# Patient Record
Sex: FEMALE | Race: BLACK OR AFRICAN AMERICAN | URBAN - METROPOLITAN AREA
[De-identification: names, ages, dates, MRNs, and addresses within clinical notes are randomized per-mention and may not be internally consistent; named-entity substitution may affect disease eponyms.]

---

## 2017-10-16 ENCOUNTER — HISTORICAL (OUTPATIENT)
Dept: ADMINISTRATIVE | Facility: HOSPITAL | Age: 57
End: 2017-10-16

## 2022-04-30 NOTE — ED PROVIDER NOTES
Patient:   Mikayla Pearson            MRN: [id number]            FIN: [id number]               Age:   57 years     Sex:  Female     :  1960   Associated Diagnoses:   Left knee pain; Left shoulder pain; Degenerative joint disease of left shoulder; Status post total left knee replacement   Author:   Paige Miles      Basic Information   Time seen: Date & time 10/16/2017 14:00:00.   History source: Patient.   Arrival mode: Private vehicle, wheelchair.   Additional information: Chief Complaint from Nursing Triage Note : Chief Complaint   10/16/2017 13:18 CDT     Chief Complaint           C/O LEFT SHOULDER AND LEFT KNEE PAIN. FELL X 2 WEEKS AGO. SENT HERE FOR ORTHO REFERRAL  .      History of Present Illness   The patient presents with knee pain.  The onset was 1  weeks ago.  The course/duration of symptoms is constant.  Type of injury: fall.  Location: Left knee. The character of symptoms is pain.  The degree at present is moderate.  There are exacerbating factors including weight bearing and walking.  Risk factors consist of previous surgery.  Associated symptoms: denies fever, denies chills, denies rash, denies edema, denies chest pain and denies shortness of breath.  Patient with a past medical history of HTN and ischemic right sided CVA presents to the ED with complaints of left knee and shoulder pain following a fall early last week. Patient states she has had a total knee replacement to both knees secondary to arthritis roughly 7 years ago. Patient admits roughly 1 week ago her left knee gave out causing her to fall. Her left leg went underneath her and she landed directly onto the knee. She denies any head injury or LOC. A couple days following the fall she presented to the [facility] ED secondary to increased pain and she was told there was suspicion that her hardware was loosening and she needed to follow up in our ED to be seen by orthopedics. Patient also notes left shoulder  pain sustained during the fall. She denies any head injury, LOC, fever, chills, nausea, vomiting, CP, SOB, or abdominal pain.        Review of Systems   Constitutional symptoms:  No fever, no chills, no weakness.    Skin symptoms:  No jaundice, no rash.    Eye symptoms:  Vision unchanged, No recent vision problems,    ENMT symptoms:  No sore throat,    Respiratory symptoms:  No shortness of breath, no cough.    Cardiovascular symptoms:  No chest pain, no palpitations.    Gastrointestinal symptoms:  No abdominal pain, no nausea, no vomiting, no diarrhea, no constipation.    Genitourinary symptoms:  No dysuria,    Musculoskeletal symptoms:  Negative except as documented in HPI.   Neurologic symptoms:  No headache, no dizziness, no numbness, no tingling.    Hematologic/Lymphatic symptoms:  Bleeding tendency negative,       Health Status   Allergies:    Allergic Reactions (Selected)  No Known Allergies,    Allergies (1) Active Reaction  No Known Allergies None Documented  .   Medications:  (Selected)   Documented Medications  Documented  ALLERGY 10 MG TABLET: 10 mg = 1 tab(s), Oral, Daily  ASPIRIN  MG TABLET: 325 mg = 1 tab(s), Oral, Daily  ATORVASTATIN 20 MG TABLET: 20 mg = 1 tab(s), Oral, Daily  Ciprofloxacin 500 Mg Tab: 500 mg = 1 tab(s), Oral, BID  FLUTICASONE PROP 50 MCG SPRAY:   Fluconazole 100 Mg Tab: 100 mg = 1 tab(s), Oral, BID  Hctz 25 Mg Tab: 25 mg = 1 tab(s), Oral, Daily  LOSARTAN POTASSIUM 25 MG TAB: 25 mg = 1 tab(s), Oral, Daily  Losartan Pot 50 Mg Tab: 50 mg = 1 tab(s), Oral, Daily  MELOXICAM 7.5 MG TABLET: 7.5 mg = 1 tab(s), Oral, Daily  METRONIDAZOLE 500 MG TABLET: 500 mg = 1 tab(s), Oral, BID  NAPROXEN 500 MG TABLET:   NITROFURANTOIN MONO- M mg = 1 cap(s), Oral, BID  Plavix 75 mg oral tablet: 75 mg = 1 tab(s), Oral, Daily, 0 Refill(s)  TRAMADOL HCL 50 MG TABLET:   Tums 500: 1,000 mg = 2 tab(s), Oral, TID, 0 Refill(s)  VENTOLIN HFA 90 MCG INHALER:   ZOLPIDEM TARTRATE 10 MG TABLET: 10  mg = 1 tab(s), Oral, qPM  cetirizine 10 mg oral tablet: 10 mg = 1 tab(s), Oral, Daily, # 30 tab(s), 0 Refill(s).      Past Medical/ Family/ Social History   Medical history:    Resolved  Hypertension ([id number]):  Resolved..   Surgical history:    Knee replacement ([id number]).  Partial hysterectomy (7819083658)..   Family history:    No family history items have been selected or recorded..   Problem list:    Active Problems (4)  Abdominal pain   Impaired mobility   Self -care deficit   Vaginitis   .      Physical Examination               Vital Signs   Vital Signs   10/16/2017 13:18 CDT     Temperature Oral          36.8 DegC                             Peripheral Pulse Rate     56 bpm  LOW                             Respiratory Rate          20 br/min                             SpO2                      99 %                             Oxygen Therapy            Room air                             Systolic Blood Pressure   131 mmHg                             Diastolic Blood Pressure  77 mmHg  .      Vital Signs (last 24 hrs)_____  Last Charted___________  Temp Oral     36.8 DegC  (OCT 16 13:18)  Heart Rate Peripheral   L 56bpm  (OCT 16 13:18)  Resp Rate         20 br/min  (OCT 16 13:18)  SBP      131 mmHg  (OCT 16 13:18)  DBP      77 mmHg  (OCT 16 13:18)  SpO2      99 %  (OCT 16 13:18)  Weight      73 kg  (OCT 16 13:18)  .   Measurements   10/16/2017 13:18 CDT     Weight Dosing             73 kg                             Weight Measured           73 kg                             Weight Measured and Calculated in Lbs     160.94 lb  .   Basic Oxygen Information   10/16/2017 13:18 CDT     SpO2                      99 %                             Oxygen Therapy            Room air  .   General:  Alert, no acute distress.    Skin:  Warm, dry, pink, intact, normal for ethnicity, healed surgical scars to the bilateral anterior knees.    Head:  Normocephalic, atraumatic.    Neck:  Supple, trachea midline.    Eye:   Pupils are equal, round and reactive to light, normal conjunctiva.    Ears, nose, mouth and throat:  Oral mucosa moist.   Cardiovascular:  Regular rate and rhythm, No murmur.    Respiratory:  Lungs are clear to auscultation, respirations are non-labored, breath sounds are equal.    Chest wall:  No tenderness, No deformity.    Back:  Nontender.   Musculoskeletal:  Proximal upper extremity: Left, shoulder, aligned, range of motion (limited, active, restricted by pain), no focal area of TTP, no swelling, no abrasion, no erythema, no ecchymosis, no deformity, Lower extremity: Left, knee, aligned, tenderness, mild generalized anterior tenderness to palpation. Patient with limited active ROM of the knee secondary to pain. She has full extension but reported pain with flexion. No swelling, palpable deformity or increased warmth., no erythema, no ecchymosis, no effusion.    Gastrointestinal:  Soft, Nontender, Non distended.    Neurological:  Alert and oriented to person, place, time, and situation, No focal neurological deficit observed, normal sensory observed.    Psychiatric:  Cooperative, appropriate mood & affect, normal judgment.       Medical Decision Making   Differential Diagnosis:  Arthritis, internal knee injury.    Documents reviewed:  Emergency department nurses' notes.   Orders  Launch Order Profile (Selected)   Inpatient Orders  Completed  XR Knee Left 3 Views: Stat, 10/16/17 14:12:00 CDT, Pain, history of knee replacement, s/p fall with increased pain, None, Stretcher, Rad Type, 10/16/17 14:12:00 CDT  XR Shoulder Left Minimum 2 Views: Stat, 10/16/17 14:12:00 CDT, Pain, None, Wheelchair, Rad Type, 10/16/17 14:12:00 CDT.   Results review:     No qualifying data available.   Radiology results:  Rad Results (ST)  < 12 hrs   Accession: [id number]  Order: XR Shoulder Left Minimum 2 Views  Report Dt/Tm: 10/16/2017 15:16  Report:   XR Shoulder Left Minimum 2 Views     REASON FOR EXAM: Pain     COMPARISON: No  relevant comparison studies available at the time of  dictation.     FINDINGS: No fracture identified. Glenohumeral and AC joints are  aligned. Minimal spurring along the inferior glenoid. No rotator cuff  calcifications appreciated.     IMPRESSION: Mild glenohumeral degenerative changes. No acute osseous  process appreciated.      Accession: [id number]  Order: XR Knee Left 3 Views  Report Dt/Tm: 10/16/2017 14:58  Report:   Left knee multiple views.     HISTORY: Knee replacement.     FINDINGS: Examination reveals a total knee prosthesis in good  anatomical alignment and position.     IMPRESSION: Total knee prosthesis      .      Reexamination/ Reevaluation   Vital signs   Basic Oxygen Information   10/16/2017 13:18 CDT     SpO2                      99 %                             Oxygen Therapy            Room air     Course: progressing as expected.   Notes: Patient is overall well appearing, afebrile and nontoxic. Patient presented to our ED today as instruction from her ED visit in [place] to obtain ortho follow up. She was provided with a knee immobilizer when seen at [facility]. She is not wearing it today but we discussed importance of stability given her recent stroke and increased risk for falls. Her XR during that ED visit was concerning for loosening of her hardware, there is no malalignment seen on the XR. She has some degenerative changes to her L shoulder. Discussed need for ortho follow up and a referral has been filled out from the ED. Her PCP is in [place] and she will follow up as instructed. She will return for any acute changes or worsening of symptoms..      Impression and Plan   Diagnosis   Left knee pain ([id number]-JT M25.562)   Left shoulder pain (SBS64-BN M25.512)   Degenerative joint disease of left shoulder (SOC02-WN M19.012)   Status post total left knee replacement (RNE02-MS Z96.652)   Plan   Condition: Stable.    Disposition: Discharged: Time  10/16/2017 15:28:00, to home.     Prescriptions: Launch prescriptions   Pharmacy:  ZOLPIDEM TARTRATE 10 MG TABLET (Discontinue): 10/16/2017 15:25 CDT  VENTOLIN HFA 90 MCG INHALER (Discontinue): 10/16/2017 15:25 CDT  NITROFURANTOIN MONO- MG (Discontinue): 10/16/2017 15:25 CDT  METRONIDAZOLE 500 MG TABLET (Discontinue): 10/16/2017 15:25 CDT  TRAMADOL HCL 50 MG TABLET (Discontinue): 10/16/2017 15:25 CDT  NAPROXEN 500 MG TABLET (Discontinue): 10/16/2017 15:25 CDT  Fluconazole 100 Mg Tab (Discontinue): 10/16/2017 15:25 CDT  Ciprofloxacin 500 Mg Tab (Discontinue): 10/16/2017 15:25 CDT  LOSARTAN POTASSIUM 25 MG TAB (Discontinue): 10/16/2017 15:25 CDT.    Patient was given the following educational materials: Arthritis, Easy-to-Read, Joint Pain, Easy-to-Read, Knee Pain, Easy-to-Read.    Follow up with: Rachna Parry MD Within 3 to 5 days To follow up your ED visit today.; Anytime the conditions worsen, return to clinic or go to ED; [facility] A referral has been filed for you to be seen in the orthopedic clinic..    Counseled: Patient, Family, Regarding diagnosis, Regarding diagnostic results, Regarding treatment plan, Patient indicated understanding of instructions.    Orders: Launch Orders   Admit/Transfer/Discharge:  Discharge (Order): Home.